# Patient Record
Sex: MALE | Race: WHITE
[De-identification: names, ages, dates, MRNs, and addresses within clinical notes are randomized per-mention and may not be internally consistent; named-entity substitution may affect disease eponyms.]

---

## 2020-11-05 ENCOUNTER — HOSPITAL ENCOUNTER (EMERGENCY)
Dept: HOSPITAL 9 - MADERS | Age: 30
Discharge: HOME | End: 2020-11-05
Payer: COMMERCIAL

## 2020-11-05 DIAGNOSIS — R07.89: ICD-10-CM

## 2020-11-05 DIAGNOSIS — F41.0: Primary | ICD-10-CM

## 2020-11-05 DIAGNOSIS — R00.0: ICD-10-CM

## 2020-11-05 DIAGNOSIS — F17.220: ICD-10-CM

## 2020-11-05 DIAGNOSIS — Z79.899: ICD-10-CM

## 2020-11-05 DIAGNOSIS — F15.10: ICD-10-CM

## 2020-11-05 LAB
ALBUMIN SERPL BCG-MCNC: 4.9 G/DL (ref 3.5–5)
ALP SERPL-CCNC: 86 U/L (ref 40–110)
ALT SERPL W P-5'-P-CCNC: 38 U/L (ref 8–55)
ANION GAP SERPL CALC-SCNC: 19 MMOL/L (ref 10–20)
AST SERPL-CCNC: 46 U/L (ref 5–34)
BASOPHILS # BLD AUTO: 0.2 THOU/UL (ref 0–0.2)
BASOPHILS NFR BLD AUTO: 1.4 % (ref 0–1)
BILIRUB SERPL-MCNC: 1.6 MG/DL (ref 0.2–1.2)
BUN SERPL-MCNC: 20 MG/DL (ref 8.9–20.6)
CALCIUM SERPL-MCNC: 9.9 MG/DL (ref 7.8–10.44)
CHLORIDE SERPL-SCNC: 105 MMOL/L (ref 98–107)
CO2 SERPL-SCNC: 20 MMOL/L (ref 22–29)
CREAT CL PREDICTED SERPL C-G-VRATE: 0 ML/MIN (ref 70–130)
EOSINOPHIL # BLD AUTO: 0.1 THOU/UL (ref 0–0.7)
EOSINOPHIL NFR BLD AUTO: 1 % (ref 0–10)
GLOBULIN SER CALC-MCNC: 3.5 G/DL (ref 2.4–3.5)
GLUCOSE SERPL-MCNC: 88 MG/DL (ref 70–105)
HGB BLD-MCNC: 16.4 G/DL (ref 14–18)
LYMPHOCYTES # BLD AUTO: 3.9 THOU/UL (ref 1.2–3.4)
LYMPHOCYTES NFR BLD AUTO: 33.5 % (ref 21–51)
MCH RBC QN AUTO: 28.8 PG (ref 27–31)
MCV RBC AUTO: 85.7 FL (ref 78–98)
MONOCYTES # BLD AUTO: 0.8 THOU/UL (ref 0.11–0.59)
MONOCYTES NFR BLD AUTO: 7.3 % (ref 0–10)
NEUTROPHILS # BLD AUTO: 6.5 THOU/UL (ref 1.4–6.5)
NEUTROPHILS NFR BLD AUTO: 56.8 % (ref 42–75)
PLATELET # BLD AUTO: 314 THOU/UL (ref 130–400)
POTASSIUM SERPL-SCNC: 3.4 MMOL/L (ref 3.5–5.1)
RBC # BLD AUTO: 5.7 MILL/UL (ref 4.7–6.1)
SODIUM SERPL-SCNC: 141 MMOL/L (ref 136–145)
TROPONIN I SERPL DL<=0.01 NG/ML-MCNC: 0.01 NG/ML (ref ?–0.03)
WBC # BLD AUTO: 11.5 THOU/UL (ref 4.8–10.8)

## 2020-11-05 PROCEDURE — 85379 FIBRIN DEGRADATION QUANT: CPT

## 2020-11-05 PROCEDURE — 85025 COMPLETE CBC W/AUTO DIFF WBC: CPT

## 2020-11-05 PROCEDURE — 71045 X-RAY EXAM CHEST 1 VIEW: CPT

## 2020-11-05 PROCEDURE — 84484 ASSAY OF TROPONIN QUANT: CPT

## 2020-11-05 PROCEDURE — 36415 COLL VENOUS BLD VENIPUNCTURE: CPT

## 2020-11-05 PROCEDURE — 93005 ELECTROCARDIOGRAM TRACING: CPT

## 2020-11-05 PROCEDURE — 80053 COMPREHEN METABOLIC PANEL: CPT

## 2020-11-05 PROCEDURE — 96374 THER/PROPH/DIAG INJ IV PUSH: CPT

## 2020-11-05 PROCEDURE — 94760 N-INVAS EAR/PLS OXIMETRY 1: CPT

## 2020-11-05 NOTE — RAD
PORTABLE CHEST 1 VIEW:

 

Date:  11/05/2020

 

HISTORY:  

Shortness of breath. 

 

FINDINGS:

Heart size is normal. The lungs are clear. No pneumonia, edema, pleural effusion, or other acute proc
ess. 

 

IMPRESSION: 

No significant acute intrathoracic disease. 

 

 

POS: AH